# Patient Record
Sex: FEMALE | Race: WHITE
[De-identification: names, ages, dates, MRNs, and addresses within clinical notes are randomized per-mention and may not be internally consistent; named-entity substitution may affect disease eponyms.]

---

## 2019-02-11 NOTE — GHP
[f 
rep st]



                                                            HISTORY AND PHYSICAL





DATE OF ADMISSION:  02/12/2019



CHIEF COMPLAINT:  Left wrist pain.



HISTORY OF PRESENT ILLNESS:  The patient is a pleasant 28-year-old, RHD female 
who presents today for evaluation of a left wrist injury.  She states that she 
initially injured her wrist yesterday while snowboarding at [employer] 
[facility], describing an incident in which she fell backward onto an outstretched 
hand, noting immediate pain in her left wrist.  She was subsequently seen at 
our orthopedic urgent care facility, where radiographs showed a distal radius 
and ulnar styloid fracture.  The patient was placed in a sugar-tong Ortho-Glass 
splint, and was to use a simple shoulder mobilizer.  Today, the patient states 
that she has been compliant with her activity restrictions, avoiding any pushing
, pulling, lifting, twisting, or carrying with the left arm, as well as has 
remained in her splint at all times.  She notes that her pain has significantly 
improved since her last visit, notes no onset numbness and tingling.  She notes 
that she has no previous injury history to the left wrist.  She has no 
additional concerns or complaints at this time.



PAST MEDICAL HISTORY:  This is significant for a history of mild asthma.  The 
patient otherwise denies any past medical history.



PAST SURGICAL HISTORY:  This is significant for an appendectomy, with no 
adverse reaction to anesthesia noted.  Patient denies any additional past 
surgical history.



MEDICATIONS:  Patient reports that she is currently taking a multivitamin, 
MonoNessa, and an albuterol inhaler on an as-needed basis.  She otherwise 
denies any additional current medication or supplement use.



ALLERGIES:  Patient states no known drug allergies.  The patient does state 
that she has a metal allergy, and has not tolerated nickel well in the past, 
noting a rash with jewelry made of nickel, though does state that she tolerated 
surgical stainless steel in the past.



SOCIAL HISTORY:  Patient denies any current or former tobacco user.  She 
reports occasional alcohol consumption.  She denies any recreational drug use.  
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



PHYSICAL EXAMINATION:  GENERAL:  The patient is alert, calm, cooperative, and 
resting comfortably, in NAD.  The following observations are made and documented
, not because they have particular relevance to patient's exam, or the reason 
they present to the office, but rather the inclusion of such observations that 
are required to document a complete examination.  

HEENT:  NCAT, EOMI, PERRLA, ears and nares are patent without discharge.  OP is 
clear.  

NECK:  Normal appearance, trachea midline.  

RESPIRATORY:  CTAB, no increased WOB noted.  

CARDIOVASCULAR:  RRR, no M/C/G/R.  

ABDOMEN:  Soft, NT/ND, no HSM noted.  

SKIN:  Warm and dry.  Please see musculoskeletal dictation for left wrist.  

MUSCULOSKELETAL:  Examination of the left wrist reveals a deformity at the 
distal radius, with TTP over the distal radius and ulna.  Overlying skin is 
intact, without abrasions or open wounds.  The patient presented today with an 
intact sugar-tong Ortho-Glass splint.  Patient is intact neurovascularly with 
application.  No significant surrounding ecchymosis is noted.  The patient has 
intact to light touch sensation distally, as well as in the median radial and 
ulnar nerve distributions, exam limited by splint.  Patient has no TTP over her 
elbow; however, ROM is not examined today due to immobilization status.  Upper 
arm compartments are supple.  Capillary refill is less than 2 seconds in the 
finger pulps.  DNVI BUE.  

NEUROLOGIC:  A and O x3, appropriate mood and affect.  Speech is noted to be 
fluid and fluent.  

PSYCHIATRIC:  Affect normal, answers questions appropriately, pleasant and 
cooperative with exam.



RADIOGRAPHS:  Three views of the left wrist were reviewed showing a displaced 
intra-articular distal radius fracture with dorsal comminution, as well as a 
nondisplaced ulnar styloid fracture.  No additional fracture or dislocations 
were noted.  No significant osteoarthritic changes were noted.



ASSESSMENT:  Left distal radius fracture, displaced, intra-articular (ICD-10: 
S52.572S), nondisplaced ulnar styloid fracture of the left side (ICD 10: 
S52.612S).



PLAN:  This patient's case and radiographs were discussed with Dr. Haider in the 
office today, who also saw and examined the patient today.  At this time, Dr. Haider feels that unfortunately the patient's fracture pattern is unstable, and 
that her current displacement would cause significant problems if left 
untreated.  Thus, he has recommended a surgical fixation of this fracture.  
After reviewing the risks and benefits of an open reduction/internal fixation 
of the left distal radius, as well as a possible open reduction/internal 
fixation of the left ulnar styloid fracture, the patient has decided to proceed 
with the aforementioned procedure.  Again, risks and benefits, as well as 
alternatives to surgery, were discussed with the patient today, and a signed 
informed consent was obtained.  [facility] as well as preoperative orders.  The patient will 
receive IV Ancef for perioperative antibiotic prophylaxis.  She was given a 
prescription for Norco 5/325 mg for postoperative pain management yesterday 
when she presented to the orthopedic urgent care.  We have also discussed 
postoperative pain management including use of NSAID medications for a brief 
period postoperatively.  I have also recommended aspirin 325 mg 1 p.o. daily 
with a meal x14 days postoperatively for VTE chemoprophylaxis.  The patient and 
I have reviewed the postoperative timeline, including likely plaster splint 
immobilization postoperatively until her first postoperative followup.  She is 
to avoid significant pushing, pulling, lifting, twisting, or carrying 
activities during that time, as well as the use of ice and elevation for relief 
of mild pain and swelling.  All of the patient's questions have been answered 
today, her concerns addressed.  She has relayed her understanding of the 
current care plan and education presented today.  



It remains my pleasure to assist in the care of this patient.  This procedure 
is currently scheduled to be performed on 02/12/2019, at [facility].





Job #:  [id number]

MTDD

## 2019-02-12 ENCOUNTER — HOSPITAL ENCOUNTER (OUTPATIENT)
Dept: HOSPITAL 80 - FSGY | Age: 29
Discharge: HOME | End: 2019-02-12
Attending: ORTHOPAEDIC SURGERY
Payer: COMMERCIAL

## 2019-02-12 VITALS — SYSTOLIC BLOOD PRESSURE: 114 MMHG | DIASTOLIC BLOOD PRESSURE: 85 MMHG

## 2019-02-12 DIAGNOSIS — S52.615A: ICD-10-CM

## 2019-02-12 DIAGNOSIS — S52.572A: Primary | ICD-10-CM

## 2019-02-12 DIAGNOSIS — J45.909: ICD-10-CM

## 2019-02-12 DIAGNOSIS — Z91.048: ICD-10-CM
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
## 2019-02-12 PROCEDURE — C1713 ANCHOR/SCREW BN/BN,TIS/BN: HCPCS

## 2019-02-12 PROCEDURE — 0PSJ04Z REPOSITION LEFT RADIUS WITH INTERNAL FIXATION DEVICE, OPEN APPROACH: ICD-10-PCS | Performed by: ORTHOPAEDIC SURGERY

## 2019-02-12 PROCEDURE — 0PSL04Z REPOSITION LEFT ULNA WITH INTERNAL FIXATION DEVICE, OPEN APPROACH: ICD-10-PCS | Performed by: ORTHOPAEDIC SURGERY

## 2019-02-12 NOTE — GOP
[f rep st]



                                                                OPERATIVE REPORT





DATE OF OPERATION:  02/12/2019



SURGEON:  Santino Haider MD



ASSISTANT:  Rogerio Marte PA-C



PREOPERATIVE DIAGNOSIS:  Left distal radius fracture.



POSTOPERATIVE DIAGNOSIS:  Left distal radius fracture.



PROCEDURE PERFORMED:  Open reduction, internal fixation, left comminuted intra-articular distal radiu
s fracture.



FINDINGS:  Anatomical reduction was achieved.  The bones were held in a reduced position with a Synth
es titanium volar variable angle locking plate.  The patient does have a nickel allergy, precluding p
rudent use of stainless steel implant.





INDICATIONS:  The patient is a 28-year-old woman who was snowboarding 2 days ago.  She inadvertently 
fell, landing awkwardly on her outstretched hand.  She sustained the above-noted distal radius fractu
re.  Due to the displaced, angulated nature of the fracture with intra-articular comminution, she is 
brought to the operating room for definitive surgical management.



DESCRIPTION OF PROCEDURE:  After routinely checking the patient's identification and consent and the 
successful induction of LMA general anesthetic, the patient's left upper extremity was prepped and dr
aped in the usual standard fashion.  I exsanguinated the limb with an Esmarch wrap, and a pneumatic t
ourniquet previously placed about the proximal left arm was inflated to 250 mmHg.  A surgical time-ou
t was completed.  A longitudinal incision directly over the FCR tendon was carried sharply through th
e skin at the radial volar border of the wrist.  I dissected bluntly through the subcutaneous layer a
nd uncovered the FCR tendon.  I retracted this radially.  I incised the fascia deep to the FCR tendon
 and then swept the contents of the volar forearm in an ulnarward direction.  This exposed the pronat
or quadratus muscle.  I  the pronator quadratus muscle from the volar distal radius cortex i
n a subperiosteal manner.  The fracture was now well exposed.  There were 2 fracture fragments, 1 rad
ial and 1 ulnar.  I joined these together with manual compression and then flexed the wrist and dista
l fragment.  This appeared to reduce satisfactorily and stay as a single unit of bone distally.  I af
fixed the plate to the shaft using standard AO technique.  Once the plate was affixed to the shaft.  
I held the distal radius reduced to the plate and then filled the distal radius variable angle lockin
g screw holes.  These achieved good purchase in the bone.  After I placed the 1st screw, I used a [facility]
ll FluoroScan unit to verify this was appropriately positioned.  The remaining screw holes were fille
d.  I did not fill the intermediary distal screw holes as much as these would have been in the fractu
re plane predominantly.  I felt the fixation was adequate in terms of stability and sturdiness.  The 
proximal screw holes were also filled with locking screws.  I irrigated the wound thoroughly.  I repa
ired the pronator quadratus back to its native position over the plate.  I then allowed the contents 
of the volar forearm to resume their normal anatomical positions.  I left the fascia open, but closed
 the subcutaneous layer with 2-0 Vicryl and 4-0 Vicryl and the skin with subcuticular 4-0 Monocryl.  
0.25% Marcaine plus epinephrine was infiltrated around the fracture at the hematoma block and around 
the volar wrist to assist in hemostasis as well as postoperative pain control.  A sterile bulky dress
ing was applied after the conclusion of the case, followed by a volar plaster splint and compressive 
wrap.  She tolerated the procedure well.  There were no complications.



REASON FOR SURGICAL ASSISTANT:  A surgical assistant was medically necessary and required to complete
 this case.  The assistant was used to retract the median nerve and the flexor tendons during the lian
tom to the volar distal radius.





Job #:  [id number]

## 2019-02-12 NOTE — POSTOPPROG
Post Op Note


Date of Operation: 02/12/19


Surgeon: Santino Haider


Assistant: Rogerio Marte


Anesthesiologist: Miranda


Anesthesia: LMA


Pre-op Diagnosis: Left DRF


Post-op Diagnosis: Left DRF


Procedure: ORIF L 3 part DRF


Findings: Synthes Titanium Volar Distal Radius Locking Plate


Inf/Abcess present in the surg proc area at time of surgery?: No


EBL: Minimal

## 2019-02-12 NOTE — PDHPUP
History & Physical Update


H&P update statement: 


This history and physical update is based on an assessment of the patient which 

was completed after admission or registration (within 24 hours), but prior to 

the surgery/procedure.





H&P update: H&P reviewed & patient examined, changes noted (Patient does note 

an allergy to metals including nickel.  Athough she has tolerated surgical 

stainless steel in the past according to her statement, we have decided to 

proceed with titanium implants as an alternative to stainless steel.  This 

information was relayed after the H&P was dictated but before admission today.  

)

## 2019-02-12 NOTE — PDANEPAE
ANE Past Medical History





- Cardiovascular History


Hx Hypertension: No


Hx Arrhythmias: No


Hx Chest Pain: No


Hx Coronary Artery / Peripheral Vascular Disease: No


Hx CHF / Valvular Disease: No


Hx Palpitations: No





- Pulmonary History


Hx COPD: No


Hx Asthma/Reactive Airway Disease: Yes


Hx Recent Upper Respiratory Infection: No


Hx Oxygen in Use at Home: No


Hx Sleep Apnea: No


Sleep Apnea Screening Result - Last Documented: Negative


Pulmonary History Comment: instructed pt to bring inhaler





- Neurologic History


Hx Cerebrovascular Accident: No


Hx Seizures: No


Hx Dementia: No





- Endocrine History


Hx Diabetes: No





- Renal History


Hx Renal Disorders: No





- Liver History


Hx Hepatic Disorders: No





- Neurological & Psychiatric Hx


Hx Neurological and Psychiatric Disorders: No





- Cancer History


Hx Cancer: No





- Congenital Disorder History


Hx Congenital Disorders: No





- GI History


Hx Gastrointestinal Disorders: No





- Other Health History


Other Health History: wears glasses/ contacts





- Chronic Pain History


Chronic Pain: No





- Surgical History


Prior Surgeries: appy





ANE Review of Systems


Review of Systems: 








- Exercise capacity


METS (RN): 5 METS





ANE Patient History





- Allergies


Allergies/Adverse Reactions: 








metal Allergy (Uncoded 02/11/19 16:00)


 Rash








- Home Medications


Home Medications: 








Birth Control Pill  02/11/19 [Last Taken 02/11/19]


Herbals/Supplements -Info Only  02/11/19 [Last Taken 02/11/19]


Norco 5-325 Tablet  02/11/19 [Last Taken 02/12/19 06:00]


Proair Hfa  02/11/19 [Last Taken 02/05/19]








- NPO status


NPO Since - Liquids (Date): 02/12/19


NPO Since - Liquids (Time): 11:00


NPO Since - Solids (Date): 02/12/19


NPO Since - Solids (Time): 06:00





- Smoking Hx


Smoking Status: Never smoked





- Family Anes Hx


Family Hx Anesthesia Complications: none





ANE Labs/Vital Signs





- Vital Signs


Blood Pressure: 121/76


Heart Rate: 86


Respiratory Rate: 16


O2 Sat (%): 96


Height: 160.02 cm


Weight: 72.575 kg





ANE Physical Exam





- Airway


Mallampati Score: Class 2





- ASA Status


ASA Status: II





ANE Anesthesia Plan


Anesthesia Plan: GA w LMA